# Patient Record
Sex: FEMALE | Race: OTHER | Employment: UNEMPLOYED | URBAN - METROPOLITAN AREA
[De-identification: names, ages, dates, MRNs, and addresses within clinical notes are randomized per-mention and may not be internally consistent; named-entity substitution may affect disease eponyms.]

---

## 2024-11-04 ENCOUNTER — OFFICE VISIT (OUTPATIENT)
Facility: CLINIC | Age: 29
End: 2024-11-04
Payer: COMMERCIAL

## 2024-11-04 ENCOUNTER — LAB ENCOUNTER (OUTPATIENT)
Dept: LAB | Age: 29
End: 2024-11-04
Attending: FAMILY MEDICINE
Payer: COMMERCIAL

## 2024-11-04 VITALS
SYSTOLIC BLOOD PRESSURE: 128 MMHG | DIASTOLIC BLOOD PRESSURE: 82 MMHG | HEART RATE: 98 BPM | BODY MASS INDEX: 30.4 KG/M2 | WEIGHT: 161 LBS | OXYGEN SATURATION: 98 % | HEIGHT: 61 IN

## 2024-11-04 DIAGNOSIS — Q63.9 CONGENITAL ABNORMALITY OF KIDNEY: ICD-10-CM

## 2024-11-04 DIAGNOSIS — Z23 NEED FOR VACCINATION: ICD-10-CM

## 2024-11-04 DIAGNOSIS — B35.1 ONYCHOMYCOSIS: ICD-10-CM

## 2024-11-04 DIAGNOSIS — Z00.00 ENCOUNTER FOR GENERAL ADULT MEDICAL EXAMINATION W/O ABNORMAL FINDINGS: Primary | ICD-10-CM

## 2024-11-04 DIAGNOSIS — Q51.3 BICORNUATE UTERUS: ICD-10-CM

## 2024-11-04 DIAGNOSIS — Z00.00 ENCOUNTER FOR GENERAL ADULT MEDICAL EXAMINATION W/O ABNORMAL FINDINGS: ICD-10-CM

## 2024-11-04 LAB
ALBUMIN SERPL-MCNC: 4.7 G/DL (ref 3.2–4.8)
ALBUMIN/GLOB SERPL: 1.6 {RATIO} (ref 1–2)
ALP LIVER SERPL-CCNC: 76 U/L
ALT SERPL-CCNC: 14 U/L
ANION GAP SERPL CALC-SCNC: 7 MMOL/L (ref 0–18)
AST SERPL-CCNC: 21 U/L (ref ?–34)
BASOPHILS # BLD AUTO: 0.06 X10(3) UL (ref 0–0.2)
BASOPHILS NFR BLD AUTO: 0.8 %
BILIRUB SERPL-MCNC: 0.5 MG/DL (ref 0.3–1.2)
BILIRUB UR QL: NEGATIVE
BUN BLD-MCNC: 9 MG/DL (ref 9–23)
BUN/CREAT SERPL: 12 (ref 10–20)
CALCIUM BLD-MCNC: 9.8 MG/DL (ref 8.7–10.4)
CHLORIDE SERPL-SCNC: 108 MMOL/L (ref 98–112)
CHOLEST SERPL-MCNC: 208 MG/DL (ref ?–200)
CLARITY UR: CLEAR
CO2 SERPL-SCNC: 27 MMOL/L (ref 21–32)
CREAT BLD-MCNC: 0.75 MG/DL
DEPRECATED RDW RBC AUTO: 41.1 FL (ref 35.1–46.3)
EGFRCR SERPLBLD CKD-EPI 2021: 110 ML/MIN/1.73M2 (ref 60–?)
EOSINOPHIL # BLD AUTO: 0.3 X10(3) UL (ref 0–0.7)
EOSINOPHIL NFR BLD AUTO: 4.2 %
ERYTHROCYTE [DISTWIDTH] IN BLOOD BY AUTOMATED COUNT: 12.5 % (ref 11–15)
EST. AVERAGE GLUCOSE BLD GHB EST-MCNC: 108 MG/DL (ref 68–126)
FASTING PATIENT LIPID ANSWER: YES
FASTING STATUS PATIENT QL REPORTED: YES
GLOBULIN PLAS-MCNC: 2.9 G/DL (ref 2–3.5)
GLUCOSE BLD-MCNC: 81 MG/DL (ref 70–99)
GLUCOSE UR-MCNC: NORMAL MG/DL
HBA1C MFR BLD: 5.4 % (ref ?–5.7)
HCT VFR BLD AUTO: 42.2 %
HDLC SERPL-MCNC: 64 MG/DL (ref 40–59)
HGB BLD-MCNC: 14.3 G/DL
HGB UR QL STRIP.AUTO: NEGATIVE
IMM GRANULOCYTES # BLD AUTO: 0.02 X10(3) UL (ref 0–1)
IMM GRANULOCYTES NFR BLD: 0.3 %
KETONES UR-MCNC: NEGATIVE MG/DL
LDLC SERPL CALC-MCNC: 129 MG/DL (ref ?–100)
LEUKOCYTE ESTERASE UR QL STRIP.AUTO: 75
LYMPHOCYTES # BLD AUTO: 2.06 X10(3) UL (ref 1–4)
LYMPHOCYTES NFR BLD AUTO: 28.5 %
MCH RBC QN AUTO: 30.2 PG (ref 26–34)
MCHC RBC AUTO-ENTMCNC: 33.9 G/DL (ref 31–37)
MCV RBC AUTO: 89.2 FL
MONOCYTES # BLD AUTO: 0.46 X10(3) UL (ref 0.1–1)
MONOCYTES NFR BLD AUTO: 6.4 %
NEUTROPHILS # BLD AUTO: 4.32 X10 (3) UL (ref 1.5–7.7)
NEUTROPHILS # BLD AUTO: 4.32 X10(3) UL (ref 1.5–7.7)
NEUTROPHILS NFR BLD AUTO: 59.8 %
NITRITE UR QL STRIP.AUTO: NEGATIVE
NONHDLC SERPL-MCNC: 144 MG/DL (ref ?–130)
OSMOLALITY SERPL CALC.SUM OF ELEC: 292 MOSM/KG (ref 275–295)
PH UR: 6.5 [PH] (ref 5–8)
PLATELET # BLD AUTO: 339 10(3)UL (ref 150–450)
POTASSIUM SERPL-SCNC: 4 MMOL/L (ref 3.5–5.1)
PROT SERPL-MCNC: 7.6 G/DL (ref 5.7–8.2)
PROT UR-MCNC: NEGATIVE MG/DL
RBC # BLD AUTO: 4.73 X10(6)UL
SODIUM SERPL-SCNC: 142 MMOL/L (ref 136–145)
SP GR UR STRIP: 1.01 (ref 1–1.03)
TRIGL SERPL-MCNC: 85 MG/DL (ref 30–149)
TSI SER-ACNC: 2.76 UIU/ML (ref 0.55–4.78)
UROBILINOGEN UR STRIP-ACNC: NORMAL
VLDLC SERPL CALC-MCNC: 15 MG/DL (ref 0–30)
WBC # BLD AUTO: 7.2 X10(3) UL (ref 4–11)

## 2024-11-04 PROCEDURE — 83036 HEMOGLOBIN GLYCOSYLATED A1C: CPT

## 2024-11-04 PROCEDURE — 81001 URINALYSIS AUTO W/SCOPE: CPT

## 2024-11-04 PROCEDURE — 87086 URINE CULTURE/COLONY COUNT: CPT

## 2024-11-04 PROCEDURE — 36415 COLL VENOUS BLD VENIPUNCTURE: CPT

## 2024-11-04 PROCEDURE — 80061 LIPID PANEL: CPT

## 2024-11-04 PROCEDURE — 80053 COMPREHEN METABOLIC PANEL: CPT

## 2024-11-04 PROCEDURE — 85025 COMPLETE CBC W/AUTO DIFF WBC: CPT

## 2024-11-04 PROCEDURE — 84443 ASSAY THYROID STIM HORMONE: CPT

## 2024-11-04 RX ORDER — CICLOPIROX 80 MG/ML
1 SOLUTION TOPICAL NIGHTLY
Qty: 6 ML | Refills: 3 | Status: SHIPPED | OUTPATIENT
Start: 2024-11-04

## 2024-11-04 NOTE — PROGRESS NOTES
Subjective:   Luz Hennessy is a 29 year old female who presents for Other (Establishing care)     Does have history of recurrent UTIs as a child. Did get seen by [facility] and was found to have only one functioning kidney. Patient is unsure which kidney works. Also saw gynecologist who noted bicornuate uterus on US done in office. Patient has noted worsening of urinary incontinence. Also notes urinary urgency.     Patient has also noted thickness and discoloration of bilateral great toe nails.         History/Other:    Chief Complaint Reviewed and Verified  Nursing Notes Reviewed and   Verified  Tobacco Reviewed  Allergies Reviewed  Medications Reviewed    Problem List Reviewed  Medical History Reviewed  Surgical History   Reviewed  Family History Reviewed  Social History Reviewed         Tobacco:  Social History     Tobacco Use   Smoking Status Some Days    Types: Cigarettes   Smokeless Tobacco Never   Tobacco Comments    On and off     E-Cigarettes/Vaping       Questions Responses    E-Cigarette Use Never User           Tobacco cessation counseling for <3 minutes.      Current Outpatient Medications   Medication Sig Dispense Refill    Ciclopirox 8 % External Solution Apply 1 Application topically nightly. 6 mL 3         Review of Systems:  Review of Systems   Constitutional: Negative.    HENT: Negative.     Eyes: Negative.    Respiratory: Negative.     Cardiovascular: Negative.    Gastrointestinal: Negative.    Genitourinary:  Positive for urgency.        Urinary incontinence    Musculoskeletal: Negative.    Skin:         +thickened discolored toe nails   Neurological: Negative.    Psychiatric/Behavioral: Negative.         Objective:   /82   Pulse 98   Ht 5' 1\" (1.549 m)   Wt 161 lb (73 kg)   LMP 10/30/2024 (Exact Date)   SpO2 98%   BMI 30.42 kg/m²  Estimated body mass index is 30.42 kg/m² as calculated from the following:    Height as of this encounter: 5' 1\" (1.549 m).    Weight as of  this encounter: 161 lb (73 kg).  Physical Exam  Vitals and nursing note reviewed.   Constitutional:       General: She is not in acute distress.     Appearance: Normal appearance. She is not ill-appearing.   HENT:      Head: Normocephalic and atraumatic.      Right Ear: Tympanic membrane normal. There is no impacted cerumen.      Left Ear: Tympanic membrane normal. There is no impacted cerumen.      Mouth/Throat:      Mouth: Mucous membranes are moist.      Pharynx: Oropharynx is clear. No oropharyngeal exudate or posterior oropharyngeal erythema.   Eyes:      General:         Right eye: No discharge.         Left eye: No discharge.      Extraocular Movements: Extraocular movements intact.      Pupils: Pupils are equal, round, and reactive to light.   Cardiovascular:      Rate and Rhythm: Normal rate and regular rhythm.      Heart sounds: Normal heart sounds. No murmur heard.     No friction rub. No gallop.   Pulmonary:      Effort: Pulmonary effort is normal.      Breath sounds: Normal breath sounds. No wheezing, rhonchi or rales.   Abdominal:      General: Abdomen is flat. Bowel sounds are normal. There is no distension.      Palpations: Abdomen is soft. There is no mass.      Tenderness: There is no abdominal tenderness. There is no guarding or rebound.   Musculoskeletal:         General: Normal range of motion.      Cervical back: Normal range of motion.      Right lower leg: No edema.      Left lower leg: No edema.   Skin:     General: Skin is warm and dry.      Findings: No rash.      Comments: +thickened and dicolored great toenails bilaterally   Neurological:      General: No focal deficit present.      Mental Status: She is alert. Mental status is at baseline.   Psychiatric:         Mood and Affect: Mood normal.         Behavior: Behavior normal.           Assessment & Plan:   1. Encounter for general adult medical examination w/o abnormal findings (Primary)  -     Hemoglobin A1C; Future; Expected date:  11/04/2024  -     Lipid Panel; Future; Expected date: 11/04/2024  -     TSH W Reflex To Free T4; Future; Expected date: 11/04/2024  -     CBC With Differential With Platelet; Future; Expected date: 11/04/2024  -     Comp Metabolic Panel (14); Future; Expected date: 11/04/2024    -ordered annual labs  -follows with ob/gyn for pap. Will need to return for pap due to insufficient sample    2. Need for vaccination  -     TdaP (Boostrix) Vaccine (> 7 Y)    -administered by staff    3. Congenital abnormality of kidney  -     Urinalysis with Culture Reflex; Future; Expected date: 11/04/2024  -     Nephrology Referral - In Network    -ordered renal function analysis with labs and urine as listed. Also referred to nephrology    4. Bicornuate uterus  -advised continued follow up with ob/gyn    5. Onychomycosis  -     Ciclopirox; Apply 1 Application topically nightly.  Dispense: 6 mL; Refill: 3    -ordered CMP to assess hepatic function prior to oral RX for onychomychosis. For now sent in ciclopirox.         Return in about 1 year (around 11/4/2025), or if symptoms worsen or fail to improve.    Clary Alfredo MD, 11/4/2024, 11:03 AM

## 2024-11-26 ENCOUNTER — PATIENT MESSAGE (OUTPATIENT)
Facility: CLINIC | Age: 29
End: 2024-11-26

## 2024-11-26 DIAGNOSIS — B35.1 ONYCHOMYCOSIS: Primary | ICD-10-CM

## 2024-11-27 ENCOUNTER — TELEPHONE (OUTPATIENT)
Facility: CLINIC | Age: 29
End: 2024-11-27

## 2024-11-27 RX ORDER — TERBINAFINE HYDROCHLORIDE 250 MG/1
250 TABLET ORAL DAILY
Qty: 90 TABLET | Refills: 0 | Status: SHIPPED | OUTPATIENT
Start: 2024-11-27 | End: 2025-02-25

## 2024-11-27 NOTE — TELEPHONE ENCOUNTER
Patient states her [facility] pharmacy closed. Updated pharmacy list.     Patient wanted to know if Ciclopirox sent 11/4/24 can be sent to [facility] instead. It was previously sent to [facility].     Chart reviewed. I see Dr Alfredo sent Terbinafine 250mg today to [facility].  I told patient terbinafine will work better than the topical Ciclopirox.      If for any reason insurance will not cover terbinafine or too costly, she can try the Ciclopirox. We can resend it. She will follow up if needed.

## 2024-12-31 DIAGNOSIS — B35.1 ONYCHOMYCOSIS: ICD-10-CM

## 2025-01-04 RX ORDER — TERBINAFINE HYDROCHLORIDE 250 MG/1
250 TABLET ORAL DAILY
Qty: 90 TABLET | Refills: 0 | OUTPATIENT
Start: 2025-01-04 | End: 2025-04-04

## 2025-01-04 NOTE — TELEPHONE ENCOUNTER
Duplicate request, previously addressed.       Disp Refills Start End    terbinafine 250 MG Oral Tab 90 tablet 0 11/27/2024 2/25/2025    Sig - Route: Take 1 tablet (250 mg total) by mouth daily. - Oral    Sent to pharmacy as: Terbinafine HCl 250 MG Oral Tablet (Lamisil)    E-Prescribing Status: Receipt confirmed by pharmacy (11/27/2024 10:16 AM CST)      Associated Diagnoses    Onychomycosis  - Primary        Pharmacy    [id number] - [place] [address] [address] AT [facility] [address], [phone], [phone]

## 2025-02-02 DIAGNOSIS — B35.1 ONYCHOMYCOSIS: ICD-10-CM

## 2025-02-05 RX ORDER — TERBINAFINE HYDROCHLORIDE 250 MG/1
250 TABLET ORAL DAILY
Qty: 90 TABLET | Refills: 0 | OUTPATIENT
Start: 2025-02-05 | End: 2025-05-06

## 2025-02-05 NOTE — TELEPHONE ENCOUNTER
Please Review. Protocol Failed; No Protocol     Requested Prescriptions   Pending Prescriptions Disp Refills    terbinafine 250 MG Oral Tab 90 tablet 0     Sig: Take 1 tablet (250 mg total) by mouth daily.       There is no refill protocol information for this order            Future Appointments         Provider Department Appt Notes    In 1 month Jerry Vega MD [facility], [facility] Congenital abnormality of kidney/ policy advised          Recent Outpatient Visits              3 months ago Encounter for general adult medical examination w/o abnormal findings    [facility], [facility] Clary Alferdo MD    Office Visit

## 2025-03-11 ENCOUNTER — OFFICE VISIT (OUTPATIENT)
Dept: NEPHROLOGY | Facility: CLINIC | Age: 30
End: 2025-03-11
Payer: COMMERCIAL

## 2025-03-11 VITALS
BODY MASS INDEX: 30 KG/M2 | HEART RATE: 88 BPM | WEIGHT: 160 LBS | DIASTOLIC BLOOD PRESSURE: 95 MMHG | SYSTOLIC BLOOD PRESSURE: 138 MMHG

## 2025-03-11 DIAGNOSIS — Q60.0 SOLITARY KIDNEY, CONGENITAL: Primary | ICD-10-CM

## 2025-03-11 PROCEDURE — 99204 OFFICE O/P NEW MOD 45 MIN: CPT | Performed by: INTERNAL MEDICINE

## 2025-03-11 RX ORDER — TERBINAFINE HYDROCHLORIDE 250 MG/1
250 TABLET ORAL DAILY
COMMUNITY

## 2025-03-11 NOTE — PROGRESS NOTES
Consult Requested By: Dr. Clary Alfredo    Reason for Consult: Solitary Kidney     HPI:     Patient is a 29 yrs old female with pmh of solitary kidney, recurrent UTIs as child, bicornuate uterus who presented for evaluation of solitary kidney     Lab test showed BUN/Cr 9/0.72 mg/dl with an eGFR 110 mg/dl. Serum potassium and calcium wnl. UA showed RBC with negative CS- was on last day of period     Non smoker - quit 2 yrs back. One pack a week prior to that. Occ alcohol. No NSAIDs.. no herbal supplement. No UTIs anymore.      for [employer]. Urinary incontinence since childhood.     HISTORY:  Past Medical History:    Allergic rhinitis      Past Surgical History:   Procedure Laterality Date    Other surgical history      Kidney/bladder procedures      Family History   Problem Relation Age of Onset    Stroke Mother     Dementia Maternal Grandmother     Diabetes Maternal Grandmother     Stroke Maternal Grandmother     Cancer Paternal Grandfather     Diabetes Paternal Grandfather     Asthma Brother     Asthma Brother       Social History:   Social History     Socioeconomic History    Marital status: Single   Tobacco Use    Smoking status: Former     Types: Cigarettes    Smokeless tobacco: Never    Tobacco comments:     Stop smoking 2 years ago  2023.   Vaping Use    Vaping status: Never Used   Substance and Sexual Activity    Alcohol use: Yes     Alcohol/week: 2.0 standard drinks of alcohol     Types: 2 Cans of beer per week     Comment: Social    Drug use: Not Currently     Types: Cannabis     Comment: no more only when was in high school   Other Topics Concern    Caffeine Concern No    Exercise No    Seat Belt No    Special Diet No    Stress Concern No    Weight Concern No        Medications (Active prior to today's visit):  Current Outpatient Medications   Medication Sig Dispense Refill    terbinafine 250 MG Oral Tab Take 1 tablet (250 mg total) by mouth daily.          Allergies:  Allergies[1]      ROS:     Constitutional:  Negative for decreased activity, fever, irritability and lethargy  ENMT:  Negative for ear drainage, hearing loss and nasal drainage  Eyes:  Negative for eye discharge and vision loss  Cardiovascular:  Negative for chest pain, sobs  Respiratory:  Negative for cough, dyspnea and wheezing  Gastrointestinal:  Negative for abdominal pain, constipation  Genitourinary: urinary incontinence   Endocrine:  Negative for abnormal sleep patterns, increased activity  Hema/Lymph:  Negative for easy bleeding and easy bruising  Integumentary:  Negative for pruritus and rash  Musculoskeletal:  Negative for bone/joint symptoms  Neurological:  Negative for gait disturbance  Psychiatric:  Negative for inappropriate interaction and psychiatric symptoms      Vitals:    03/11/25 1437   BP: (!) 138/95   Pulse:        PHYSICAL EXAM:   Constitutional: appears well hydrated alert and responsive no acute distress noted  Head/Face: normocephalic  Eyes/Vision: normal extraocular motion is intact  Nose/Mouth/Throat:mucous membranes are moist   Neck/Thyroid: neck is supple  Skin/Hair: no unusual rashes present  Back/Spine: no abnormalities noted  Musculoskeletal:  no deformities  Extremities: no edema  Neurological:  Grossly normal       ASSESSMENT/PLAN:     Patient is a 29 yrs old female with pmh of solitary kidney, recurrent UTIs as child, bicornuate uterus who presented for evaluation of solitary kidney     Solitary Kidney:   Secondary to Mullerian Abnormality in light of bicornuate uterus   BUN/Cr 9/0.72 mg/dl with an eGFR 110 mg/dl.   Serum potassium and calcium wnl.   UA showed RBC with negative CS- was on last day of period   Had frequent UTIs growing up - no further UTIs  Check renal US   Urinary incontinence : Will assess for the need to refer to urologist or urogynecologist       2. High BP: recheck   Check at home and send 2 weeks readings to PCP     Follow up as needed       Orders This Visit:  No orders of the defined types were placed in this encounter.      Meds This Visit:  Requested Prescriptions      No prescriptions requested or ordered in this encounter       Imaging & Referrals:  US KIDNEY/BLADDER (TTQ=[id number])     3/11/2025  Jerry Francis MD      No follow-ups on file.          [1] No Known Allergies

## 2025-03-17 ENCOUNTER — HOSPITAL ENCOUNTER (OUTPATIENT)
Dept: ULTRASOUND IMAGING | Age: 30
Discharge: HOME | End: 2025-03-17
Attending: INTERNAL MEDICINE
Payer: COMMERCIAL

## 2025-03-17 DIAGNOSIS — Q60.0 SOLITARY KIDNEY, CONGENITAL: ICD-10-CM

## 2025-03-17 PROCEDURE — 76770 US EXAM ABDO BACK WALL COMP: CPT | Performed by: INTERNAL MEDICINE
